# Patient Record
Sex: FEMALE | Race: WHITE | Employment: FULL TIME | URBAN - METROPOLITAN AREA
[De-identification: names, ages, dates, MRNs, and addresses within clinical notes are randomized per-mention and may not be internally consistent; named-entity substitution may affect disease eponyms.]

---

## 2017-04-24 ENCOUNTER — TELEPHONE (OUTPATIENT)
Dept: OBGYN CLINIC | Facility: CLINIC | Age: 32
End: 2017-04-24

## 2017-04-24 ENCOUNTER — APPOINTMENT (OUTPATIENT)
Dept: LAB | Age: 32
End: 2017-04-24
Attending: FAMILY MEDICINE
Payer: COMMERCIAL

## 2017-04-24 DIAGNOSIS — O26.859 SPOTTING AFFECTING PREGNANCY: Primary | ICD-10-CM

## 2017-04-24 DIAGNOSIS — O26.859 SPOTTING AFFECTING PREGNANCY: ICD-10-CM

## 2017-04-24 PROCEDURE — 36415 COLL VENOUS BLD VENIPUNCTURE: CPT

## 2017-04-24 PROCEDURE — 86901 BLOOD TYPING SEROLOGIC RH(D): CPT

## 2017-04-24 PROCEDURE — 84144 ASSAY OF PROGESTERONE: CPT

## 2017-04-24 PROCEDURE — 86900 BLOOD TYPING SEROLOGIC ABO: CPT

## 2017-04-24 PROCEDURE — 84702 CHORIONIC GONADOTROPIN TEST: CPT

## 2017-04-24 NOTE — TELEPHONE ENCOUNTER
Patient states she is 6-7 weeks pregnant based on LMP. This morning at work she had some bleeding with urination and when she wiped. Now she is only spotting. She has a h/o miscarriage. HCG, Progesterone, and Blood Type ordered for patient.   She will go

## 2017-04-24 NOTE — TELEPHONE ENCOUNTER
Pt calling and is 6-7 Weeks Pregnant and is bleeding 20 min  No cramping    Pt had a miscarriage before    Please call

## 2017-04-25 ENCOUNTER — TELEPHONE (OUTPATIENT)
Dept: OBGYN CLINIC | Facility: CLINIC | Age: 32
End: 2017-04-25

## 2017-04-27 ENCOUNTER — OFFICE VISIT (OUTPATIENT)
Dept: OBGYN CLINIC | Facility: CLINIC | Age: 32
End: 2017-04-27

## 2017-04-27 DIAGNOSIS — O26.899 RH NEGATIVE STATE IN ANTEPARTUM PERIOD, UNSPECIFIED TRIMESTER: ICD-10-CM

## 2017-04-27 DIAGNOSIS — O46.90 VAGINAL BLEEDING IN PREGNANCY, UNSPECIFIED TRIMESTER: Primary | ICD-10-CM

## 2017-04-27 DIAGNOSIS — Z67.91 RH NEGATIVE STATE IN ANTEPARTUM PERIOD, UNSPECIFIED TRIMESTER: ICD-10-CM

## 2017-04-27 PROCEDURE — 96372 THER/PROPH/DIAG INJ SC/IM: CPT | Performed by: OBSTETRICS & GYNECOLOGY

## 2017-04-27 NOTE — TELEPHONE ENCOUNTER
Pt reports return of spotting today; brown, scant when wiping. Denies cramping. NOB pending 5/3/17. Labs drawn 4/24 with results given to patient. Pt advised to monitor; keep NOB appt; call for increased bleeding. Patient verbalized understanding.   Rout

## 2017-05-03 ENCOUNTER — OFFICE VISIT (OUTPATIENT)
Dept: OBGYN CLINIC | Facility: CLINIC | Age: 32
End: 2017-05-03

## 2017-05-03 VITALS
WEIGHT: 180 LBS | BODY MASS INDEX: 29.63 KG/M2 | HEIGHT: 65.5 IN | SYSTOLIC BLOOD PRESSURE: 112 MMHG | DIASTOLIC BLOOD PRESSURE: 78 MMHG

## 2017-05-03 DIAGNOSIS — O20.0 THREATENED ABORTION, ANTEPARTUM: Primary | ICD-10-CM

## 2017-05-03 PROCEDURE — 76817 TRANSVAGINAL US OBSTETRIC: CPT | Performed by: OBSTETRICS & GYNECOLOGY

## 2017-05-03 PROCEDURE — 99213 OFFICE O/P EST LOW 20 MIN: CPT | Performed by: OBSTETRICS & GYNECOLOGY

## 2017-05-03 NOTE — PROGRESS NOTES
NOB  Had a week of light bleeding    Miscarriage, D&C in December  PAP normal   LMP 3/8/17, had bleeding, normal progesterone, hcg, level, got Rhogam .     PE:  GENERAL: well developed, well nourished, in no apparent distress  SKIN:

## 2017-05-15 ENCOUNTER — OFFICE VISIT (OUTPATIENT)
Dept: OBGYN CLINIC | Facility: CLINIC | Age: 32
End: 2017-05-15

## 2017-05-15 ENCOUNTER — ULTRASOUND ENCOUNTER (OUTPATIENT)
Dept: OBGYN CLINIC | Facility: CLINIC | Age: 32
End: 2017-05-15

## 2017-05-15 DIAGNOSIS — O02.1 MISSED ABORTION: Primary | ICD-10-CM

## 2017-05-15 DIAGNOSIS — O46.90 VAGINAL BLEEDING IN PREGNANCY, UNSPECIFIED TRIMESTER: ICD-10-CM

## 2017-05-15 PROCEDURE — 99213 OFFICE O/P EST LOW 20 MIN: CPT | Performed by: OBSTETRICS & GYNECOLOGY

## 2017-05-15 PROCEDURE — 76830 TRANSVAGINAL US NON-OB: CPT | Performed by: OBSTETRICS & GYNECOLOGY

## 2017-05-15 NOTE — PROGRESS NOTES
GYN H&P     5/15/2017  10:39 AM    CC: Patient presents with: Other: US review      HPI: patient is a 32year old  here for Patient presents with: Other: US review      Pt is here for repeat US.  Last US on 5/3/17 showed no definite fetal pole and Age of Onset   • Heart Disease Other    • Diabetes Father    • Cancer Maternal Grandmother      lung   • Cancer Paternal Aunt      brain       Social History   Marital Status:   Spouse Name: N/A    Years of Education: N/A  Number of Children: N/A prefers.    - Transvaginal Osvaldo Rox Only [id number]; Future  - Transvaginal US GYNE Only [12344]    2. Vaginal bleeding in pregnancy, unspecified trimester    - Transvaginal US GYNE Only [35666];  Future  - Transvaginal US GYNE Only [54350]      MARCEL San

## 2017-05-16 ENCOUNTER — TELEPHONE (OUTPATIENT)
Dept: OBGYN CLINIC | Facility: CLINIC | Age: 32
End: 2017-05-16

## 2017-05-17 ENCOUNTER — TELEPHONE (OUTPATIENT)
Dept: OBGYN CLINIC | Facility: CLINIC | Age: 32
End: 2017-05-17

## 2017-05-17 NOTE — TELEPHONE ENCOUNTER
Contacted patient per Dr. Maximilian Mcbride to f/u on missed AB. Patient is still deciding on what she wants to do. She denies any vaginal bleeding, cramping, or pain. She will contact us once she decides. Dr. Maximilian Mcbride, Sylvester Simmonds.

## 2017-05-22 NOTE — TELEPHONE ENCOUNTER
Pt would like to wait for miscarriage to happen. Pt reports no bleeding or cramping at this time. Bleeding precautions reviewed with patient and use of Ibuprofen for cramping. Patient verbalized understanding. Routed to Dr. Pimentel Congress.   Please advise anyth

## 2017-05-23 NOTE — TELEPHONE ENCOUNTER
Advise pt to call us after miscarriage, she will need rhogham as she is rh negative within 72 hours of miscarriage and we will need to follow her hcg to zero.    Would advise f/u appt in 1-2 weeks if still no miscarriage

## 2017-05-25 NOTE — TELEPHONE ENCOUNTER
Pt reports moderate bleeding has started with mild cramping. Advised pt bleeding will become heavier with passage of tisse and/or clots; cramping may increase. Advised pt to call office when this happens; needs Rhogam within 72 hrs.   Heavy bleeding preca

## 2017-05-31 ENCOUNTER — TELEPHONE (OUTPATIENT)
Dept: OBGYN CLINIC | Facility: CLINIC | Age: 32
End: 2017-05-31

## 2017-05-31 ENCOUNTER — LAB ENCOUNTER (OUTPATIENT)
Dept: LAB | Age: 32
End: 2017-05-31
Attending: OBSTETRICS & GYNECOLOGY
Payer: COMMERCIAL

## 2017-05-31 DIAGNOSIS — O03.9 MISCARRIAGE: ICD-10-CM

## 2017-05-31 DIAGNOSIS — O03.9 MISCARRIAGE: Primary | ICD-10-CM

## 2017-05-31 PROCEDURE — 36415 COLL VENOUS BLD VENIPUNCTURE: CPT

## 2017-05-31 PROCEDURE — 86850 RBC ANTIBODY SCREEN: CPT

## 2017-05-31 PROCEDURE — 86870 RBC ANTIBODY IDENTIFICATION: CPT

## 2017-05-31 PROCEDURE — 84702 CHORIONIC GONADOTROPIN TEST: CPT

## 2017-05-31 NOTE — TELEPHONE ENCOUNTER
PT scheduled to come for a Rhogam injection tomorrow  PT has question regarding if current symptoms are normal and or what to expect

## 2017-05-31 NOTE — TELEPHONE ENCOUNTER
Patient will either have blood work done today or tomorrow AM.  Informed patient if tomorrow will have to re-schedule RhoGAM until Friday. Patient verbalized understanding. Will call in the morning for results.

## 2017-05-31 NOTE — TELEPHONE ENCOUNTER
Can patient go for quant and rhogam studies today so we can see if she is positive with antibody screen in the am? Then we can determine if Rhogam is appropriate.

## 2017-05-31 NOTE — TELEPHONE ENCOUNTER
Patient stated over weekend she began bleeding (Saturday). She passed a lot of clumpy blood - now it is a light period. Denies any severe abdominal pain or cramping. Patient is scheduled for RhoGAM tomorrow. Still OK since >72 hours? Please advise.

## 2017-06-01 ENCOUNTER — TELEPHONE (OUTPATIENT)
Dept: OBGYN CLINIC | Facility: CLINIC | Age: 32
End: 2017-06-01

## 2017-06-01 DIAGNOSIS — O03.9 MISCARRIAGE: Primary | ICD-10-CM

## 2017-06-01 NOTE — TELEPHONE ENCOUNTER
Patient's antibody screen positive. Received RhoGAM on 04/27/17. Follow HCG until negative per Dr. Pimentel Congress. Patient also does not need RhoGAM.  Patient notified. Verbalized understanding. No further questions or concerns.

## 2017-06-01 NOTE — TELEPHONE ENCOUNTER
PT calling and asking if she needs to come in for Rhogam shot    She said it was contingent on Labs    Please call

## 2017-07-03 ENCOUNTER — LAB ENCOUNTER (OUTPATIENT)
Dept: LAB | Age: 32
End: 2017-07-03
Attending: OBSTETRICS & GYNECOLOGY
Payer: COMMERCIAL

## 2017-07-03 DIAGNOSIS — O03.9 MISCARRIAGE: ICD-10-CM

## 2017-07-03 LAB — HCG QUANTITATIVE: 156 MIU/ML (ref ?–1)

## 2017-07-03 PROCEDURE — 84702 CHORIONIC GONADOTROPIN TEST: CPT

## 2017-07-03 PROCEDURE — 36415 COLL VENOUS BLD VENIPUNCTURE: CPT

## 2017-07-05 DIAGNOSIS — O03.4 INCOMPLETE SPONTANEOUS ABORTION: Primary | ICD-10-CM

## 2017-07-05 NOTE — PROGRESS NOTES
Patient informed of results. Verbalized understanding. No further questions or concerns. Order entered.

## 2017-07-27 ENCOUNTER — LAB ENCOUNTER (OUTPATIENT)
Dept: LAB | Age: 32
End: 2017-07-27
Attending: NURSE PRACTITIONER
Payer: COMMERCIAL

## 2017-07-27 DIAGNOSIS — O03.4 INCOMPLETE SPONTANEOUS ABORTION: ICD-10-CM

## 2017-07-27 LAB — HCG QUANTITATIVE: 73 MIU/ML (ref ?–1)

## 2017-07-27 PROCEDURE — 84702 CHORIONIC GONADOTROPIN TEST: CPT

## 2017-07-27 PROCEDURE — 36415 COLL VENOUS BLD VENIPUNCTURE: CPT

## 2018-06-15 ENCOUNTER — CHARTING TRANS (OUTPATIENT)
Dept: OTHER | Age: 33
End: 2018-06-15

## 2018-11-01 VITALS
HEIGHT: 64 IN | TEMPERATURE: 98.4 F | BODY MASS INDEX: 29.02 KG/M2 | RESPIRATION RATE: 14 BRPM | DIASTOLIC BLOOD PRESSURE: 72 MMHG | SYSTOLIC BLOOD PRESSURE: 108 MMHG | WEIGHT: 170 LBS | HEART RATE: 80 BPM

## 2019-03-18 ENCOUNTER — WALK IN (OUTPATIENT)
Dept: URGENT CARE | Age: 34
End: 2019-03-18

## 2019-03-18 DIAGNOSIS — Z23 NEED FOR DIPHTHERIA-TETANUS-PERTUSSIS (TDAP) VACCINE: Primary | ICD-10-CM

## 2019-03-18 PROCEDURE — 90471 IMMUNIZATION ADMIN: CPT | Performed by: NURSE PRACTITIONER

## 2019-03-18 PROCEDURE — 90715 TDAP VACCINE 7 YRS/> IM: CPT | Performed by: NURSE PRACTITIONER

## 2020-01-08 ENCOUNTER — APPOINTMENT (OUTPATIENT)
Dept: DERMATOLOGY | Age: 35
End: 2020-01-08

## 2021-05-06 ENCOUNTER — PATIENT OUTREACH (OUTPATIENT)
Dept: FAMILY MEDICINE CLINIC | Facility: CLINIC | Age: 36
End: 2021-05-06

## 2022-04-07 ENCOUNTER — TELEPHONE (OUTPATIENT)
Dept: SCHEDULING | Age: 37
End: 2022-04-07

## 2022-04-07 ENCOUNTER — OFFICE VISIT (OUTPATIENT)
Dept: URGENT CARE | Age: 37
End: 2022-04-07

## 2022-04-07 VITALS
SYSTOLIC BLOOD PRESSURE: 140 MMHG | TEMPERATURE: 98.4 F | RESPIRATION RATE: 20 BRPM | DIASTOLIC BLOOD PRESSURE: 78 MMHG | HEART RATE: 80 BPM

## 2022-04-07 DIAGNOSIS — J00 ACUTE NASOPHARYNGITIS: Primary | ICD-10-CM

## 2022-04-07 LAB
INTERNAL PROCEDURAL CONTROLS ACCEPTABLE: YES
S PYO AG THROAT QL IA.RAPID: NEGATIVE

## 2022-04-07 PROCEDURE — 99203 OFFICE O/P NEW LOW 30 MIN: CPT | Performed by: NURSE PRACTITIONER

## 2022-04-07 PROCEDURE — 87880 STREP A ASSAY W/OPTIC: CPT | Performed by: NURSE PRACTITIONER

## 2022-04-07 ASSESSMENT — ENCOUNTER SYMPTOMS
CHOKING: 0
HEMATOLOGIC/LYMPHATIC NEGATIVE: 1
CONSTITUTIONAL NEGATIVE: 1
STRIDOR: 0
SORE THROAT: 1
CHEST TIGHTNESS: 0
WHEEZING: 0
SHORTNESS OF BREATH: 0
PSYCHIATRIC NEGATIVE: 1
NEUROLOGICAL NEGATIVE: 1
APNEA: 0
EYES NEGATIVE: 1
ALLERGIC/IMMUNOLOGIC NEGATIVE: 1
SINUS PAIN: 0
SINUS PRESSURE: 1
COUGH: 1

## 2024-01-29 ENCOUNTER — TELEPHONIC VISIT (OUTPATIENT)
Dept: URGENT CARE | Age: 39
End: 2024-01-29

## 2024-01-29 VITALS
DIASTOLIC BLOOD PRESSURE: 80 MMHG | TEMPERATURE: 97.6 F | HEIGHT: 64 IN | RESPIRATION RATE: 18 BRPM | BODY MASS INDEX: 33.46 KG/M2 | OXYGEN SATURATION: 98 % | HEART RATE: 74 BPM | SYSTOLIC BLOOD PRESSURE: 118 MMHG | WEIGHT: 196 LBS

## 2024-01-29 DIAGNOSIS — J02.9 ACUTE PHARYNGITIS, UNSPECIFIED ETIOLOGY: ICD-10-CM

## 2024-01-29 DIAGNOSIS — J06.9 VIRAL URI WITH COUGH: Primary | ICD-10-CM

## 2024-01-29 LAB
INTERNAL PROCEDURAL CONTROLS ACCEPTABLE: YES
S PYO AG THROAT QL IA.RAPID: NEGATIVE
TEST LOT EXPIRATION DATE: NORMAL
TEST LOT NUMBER: NORMAL

## 2024-01-29 PROCEDURE — 99213 OFFICE O/P EST LOW 20 MIN: CPT | Performed by: NURSE PRACTITIONER

## 2024-01-29 PROCEDURE — 87880 STREP A ASSAY W/OPTIC: CPT | Performed by: NURSE PRACTITIONER

## 2024-01-29 RX ORDER — AZELASTINE HYDROCHLORIDE, FLUTICASONE PROPIONATE 137; 50 UG/1; UG/1
1 SPRAY, METERED NASAL DAILY
COMMUNITY
Start: 2023-12-15

## 2024-01-29 ASSESSMENT — ENCOUNTER SYMPTOMS
SINUS PAIN: 0
SORE THROAT: 1
SINUS PRESSURE: 0
CHEST TIGHTNESS: 0
FEVER: 0
SHORTNESS OF BREATH: 0
WHEEZING: 0
COUGH: 1
CHILLS: 0
TROUBLE SWALLOWING: 0

## (undated) NOTE — MR AVS SNAPSHOT
[address] [place] [id number] [phone]               Thank you for choosing us for your health care visit with Ora Beard MD.  We are glad to serve you and happy to provide you with this summary of your vis Take 1 tablet by mouth daily.                    Results of Recent Testing     US OB TRANSVAGINAL ANY TRIMESTER EMG ONLY             MyChart     Visit MyChart  You can access your MyChart to more actively manage your health care and view more details from t

## (undated) NOTE — MR AVS SNAPSHOT
[address], [facility] [address] [id number] [phone]               Thank you for choosing us for your health care visit with Ahsan Blackmon MD.  We are glad to serve you and happy to provide you with this summar For medical emergencies, dial 911. Educational Information     Healthy Diet and Regular Exercise  [employer] for making healthy food choices  -   Enjoy your food, but eat less. Fully enjoy your food when eating.    Don’t

## (undated) NOTE — MR AVS SNAPSHOT
[facility] [address], [facility] [address] [facility]  [phone]               Thank you for choosing us for your health care visit with Madhu Joseph MD.  We are glad to serve you and happy to provide you with this For medical emergencies, dial 911.            Visit [facility] online at  [email or web address]